# Patient Record
Sex: MALE | Race: WHITE | HISPANIC OR LATINO | URBAN - METROPOLITAN AREA
[De-identification: names, ages, dates, MRNs, and addresses within clinical notes are randomized per-mention and may not be internally consistent; named-entity substitution may affect disease eponyms.]

---

## 2019-04-01 ENCOUNTER — HOSPITAL ENCOUNTER (OUTPATIENT)
Facility: MEDICAL CENTER | Age: 34
End: 2019-04-01
Attending: PHYSICIAN ASSISTANT
Payer: COMMERCIAL

## 2019-04-01 ENCOUNTER — OFFICE VISIT (OUTPATIENT)
Dept: URGENT CARE | Facility: CLINIC | Age: 34
End: 2019-04-01
Payer: COMMERCIAL

## 2019-04-01 ENCOUNTER — HOSPITAL ENCOUNTER (OUTPATIENT)
Dept: LAB | Facility: MEDICAL CENTER | Age: 34
End: 2019-04-01
Attending: PHYSICIAN ASSISTANT
Payer: COMMERCIAL

## 2019-04-01 VITALS
OXYGEN SATURATION: 95 % | HEIGHT: 67 IN | DIASTOLIC BLOOD PRESSURE: 78 MMHG | BODY MASS INDEX: 29.03 KG/M2 | TEMPERATURE: 98.5 F | HEART RATE: 113 BPM | WEIGHT: 185 LBS | RESPIRATION RATE: 14 BRPM | SYSTOLIC BLOOD PRESSURE: 124 MMHG

## 2019-04-01 DIAGNOSIS — Z20.2 EXPOSURE TO SYPHILIS: ICD-10-CM

## 2019-04-01 DIAGNOSIS — Z21 HIV ANTIBODY POSITIVE (HCC): ICD-10-CM

## 2019-04-01 PROCEDURE — 86780 TREPONEMA PALLIDUM: CPT

## 2019-04-01 PROCEDURE — 87591 N.GONORRHOEAE DNA AMP PROB: CPT

## 2019-04-01 PROCEDURE — 86702 HIV-2 ANTIBODY: CPT

## 2019-04-01 PROCEDURE — 80074 ACUTE HEPATITIS PANEL: CPT

## 2019-04-01 PROCEDURE — 86592 SYPHILIS TEST NON-TREP QUAL: CPT

## 2019-04-01 PROCEDURE — 87491 CHLMYD TRACH DNA AMP PROBE: CPT

## 2019-04-01 PROCEDURE — 87389 HIV-1 AG W/HIV-1&-2 AB AG IA: CPT

## 2019-04-01 PROCEDURE — 36415 COLL VENOUS BLD VENIPUNCTURE: CPT

## 2019-04-01 PROCEDURE — 86701 HIV-1ANTIBODY: CPT

## 2019-04-01 PROCEDURE — 86593 SYPHILIS TEST NON-TREP QUANT: CPT

## 2019-04-01 PROCEDURE — 99203 OFFICE O/P NEW LOW 30 MIN: CPT | Performed by: PHYSICIAN ASSISTANT

## 2019-04-01 ASSESSMENT — ENCOUNTER SYMPTOMS
ABDOMINAL PAIN: 0
VOMITING: 0
FLANK PAIN: 0
NAUSEA: 0
FEVER: 0
DIARRHEA: 0
DIZZINESS: 0
HEADACHES: 0

## 2019-04-01 NOTE — PROGRESS NOTES
"Subjective:   Augusto Salamanca is a 33 y.o. male who presents for Sexually Transmitted Diseases    This is a new problem.  Patient presents to urgent care with concern for exposure to syphilis.  The patient is sexually active with multiple male partners without protection.  He reports that he was just notified that a partner he was with 2 months ago tested positive for syphilis.  The patient has no symptoms.    Past medical history, family history and social history are reviewed and updated in the record today.           Sexually Transmitted Diseases   Pertinent negatives include no abdominal pain, fever, headaches, nausea or vomiting.     Review of Systems   Constitutional: Negative for fever.   Gastrointestinal: Negative for abdominal pain, diarrhea, nausea and vomiting.   Genitourinary: Negative for dysuria, flank pain, frequency, hematuria and urgency.   Neurological: Negative for dizziness and headaches.   All other systems reviewed and are negative.    No Known Allergies     Objective:   /78   Pulse (!) 113   Temp 36.9 °C (98.5 °F) (Temporal)   Resp 14   Ht 1.702 m (5' 7\")   Wt 83.9 kg (185 lb)   SpO2 95%   BMI 28.98 kg/m²   Physical Exam   Constitutional: He is oriented to person, place, and time. He appears well-developed and well-nourished.   HENT:   Head: Normocephalic and atraumatic.   Right Ear: Tympanic membrane, external ear and ear canal normal.   Left Ear: Tympanic membrane, external ear and ear canal normal.   Nose: Nose normal.   Mouth/Throat: Uvula is midline, oropharynx is clear and moist and mucous membranes are normal. No oropharyngeal exudate.   Eyes: Pupils are equal, round, and reactive to light. Conjunctivae and EOM are normal.   Neck: Normal range of motion. Neck supple.   Cardiovascular: Normal rate, regular rhythm and normal heart sounds.  Exam reveals no friction rub.    No murmur heard.  Pulmonary/Chest: Effort normal and breath sounds normal. No respiratory distress. "   Abdominal: Soft. Bowel sounds are normal. There is no hepatosplenomegaly. There is no tenderness.   Genitourinary: Testes normal and penis normal. Cremasteric reflex is present. Circumcised. No discharge found.   Musculoskeletal: Normal range of motion.   Lymphadenopathy:        Head (right side): No submental, no submandibular and no tonsillar adenopathy present.        Head (left side): No submental, no submandibular and no tonsillar adenopathy present.     He has no cervical adenopathy.        Right: No supraclavicular adenopathy present.        Left: No supraclavicular adenopathy present.   Neurological: He is alert and oriented to person, place, and time. He has normal strength. No cranial nerve deficit or sensory deficit. Coordination normal.   Skin: Skin is warm and dry. No rash noted.   Psychiatric: He has a normal mood and affect. Judgment normal.   Vitals reviewed.         Assessment/Plan:   Assessment    1. Exposure to syphilis  - RPR  - HIV AG/AB COMBO ASSAY SCREENING; Future  - HEPATITIS PANEL ACUTE(4 COMPONENTS); Future  - CHLAMYDIA/GC PCR URINE OR SWAB; Future  - penicillin G benzathine (BICILLIN-LA) injection 2.4 Million Units; 4 mL by Intramuscular route Once.      Attempted to reach out to [facility] for confirmation regarding plan however they are at lunch and unavailable.  Per up-to-date guidelines patient will be treated with LA Bicillin 2,400,000 units here in the clinic.  We will go ahead and obtain testing as above which he must to do today due to the fact that he is being treated here in the clinic.  Encourage condom use.      Differential diagnosis, natural history, supportive care, and indications for immediate follow-up discussed.      Please note that this note was created using voice recognition speech to text software. Every effort has been made to correct obvious errors.  However, I expect there are errors of grammar and possibly context that were not discovered  prior to finalizing the note    4/4/19: Addendum: Confirm testing positive for HIV. Patient notified by telephone. I did attempt several times to contact [facility]'s clinic but was unsuccessful reaching a staff member. Urgent referral is placed to ID for further evaluation and management.  Patient is counseled on use of condoms at all times. Patient is also aware of positive testing for Syphillis. I did reach out to the referral department and reviewed with them the urgent nature of the referral and they are processing now.   SUZY Travis PA-C

## 2019-04-02 ENCOUNTER — TELEPHONE (OUTPATIENT)
Dept: URGENT CARE | Facility: CLINIC | Age: 34
End: 2019-04-02

## 2019-04-02 LAB
C TRACH DNA SPEC QL NAA+PROBE: NEGATIVE
HAV IGM SERPL QL IA: NEGATIVE
HBV CORE IGM SER QL: NEGATIVE
HBV SURFACE AG SER QL: NEGATIVE
HCV AB SER QL: NEGATIVE
HIV 1+2 AB+HIV1 P24 AG SERPL QL IA: ABNORMAL
N GONORRHOEA DNA SPEC QL NAA+PROBE: NEGATIVE
RPR SER QL: REACTIVE
RPR SER-TITR: NORMAL {TITER}
SPECIMEN SOURCE: NORMAL
TREPONEMA PALLIDUM IGG+IGM AB [PRESENCE] IN SERUM OR PLASMA BY IMMUNOASSAY: REACTIVE

## 2019-04-03 ENCOUNTER — TELEPHONE (OUTPATIENT)
Dept: URGENT CARE | Facility: CLINIC | Age: 34
End: 2019-04-03

## 2019-04-03 LAB
HIV 1 & 2 AB SER-IMP: ABNORMAL
HIV 1 & 2 AB SERPL IA.RAPID: ABNORMAL
HIV 2 AB SERPL QL IA: NEGATIVE
HIV1 AB SERPL QL IA: POSITIVE

## 2019-04-03 NOTE — TELEPHONE ENCOUNTER
Contacted patient with test results positive for syphilis.  Patient has been treated.  However, I will reach out to [facility] to ensure no additional treatment or management is required.  I also reviewed with the patient that his preliminary HIV was positive however this can be a false positive and has been sent for confirmation.  Once results are available I will reach out to the patient and if positive will refer to [facility] clinic.  I did attempt to reach [facility] clinic to no avail today.

## 2019-04-03 NOTE — TELEPHONE ENCOUNTER
Attempted to contact patient with positive test results for syphilis.  No answer, voicemail is full and unable to leave message.  I also was going to discuss with the patient that his preliminary on HIV came out positive but has been sent for confirmation.  Once test results are available I will likely place a referral to [facility].  I will continue to attempt to reach the patient once test results are confirmed.

## 2020-05-29 ENCOUNTER — HOSPITAL ENCOUNTER (OUTPATIENT)
Facility: MEDICAL CENTER | Age: 35
End: 2020-05-29
Payer: COMMERCIAL

## 2020-06-02 LAB
SARS-COV-2 RNA SPEC QL NAA+PROBE: NOT DETECTED
SPECIMEN SOURCE: NORMAL

## 2024-02-23 ENCOUNTER — HOSPITAL ENCOUNTER (OUTPATIENT)
Dept: RADIOLOGY | Facility: MEDICAL CENTER | Age: 39
End: 2024-02-23
Attending: PHYSICIAN ASSISTANT
Payer: COMMERCIAL

## 2024-02-23 DIAGNOSIS — N50.811 RIGHT TESTICULAR PAIN: ICD-10-CM

## 2024-02-23 DIAGNOSIS — N50.812 LEFT TESTICULAR PAIN: ICD-10-CM

## 2025-04-13 ENCOUNTER — OFFICE VISIT (OUTPATIENT)
Dept: URGENT CARE | Facility: PHYSICIAN GROUP | Age: 40
End: 2025-04-13
Payer: COMMERCIAL

## 2025-04-13 VITALS
DIASTOLIC BLOOD PRESSURE: 84 MMHG | WEIGHT: 199 LBS | HEIGHT: 71 IN | HEART RATE: 110 BPM | TEMPERATURE: 98.3 F | OXYGEN SATURATION: 97 % | BODY MASS INDEX: 27.86 KG/M2 | SYSTOLIC BLOOD PRESSURE: 116 MMHG | RESPIRATION RATE: 16 BRPM

## 2025-04-13 DIAGNOSIS — L02.32 BOIL OF BUTTOCK: ICD-10-CM

## 2025-04-13 DIAGNOSIS — J06.9 VIRAL URI: ICD-10-CM

## 2025-04-13 PROCEDURE — 99214 OFFICE O/P EST MOD 30 MIN: CPT

## 2025-04-13 PROCEDURE — 3079F DIAST BP 80-89 MM HG: CPT

## 2025-04-13 PROCEDURE — 3074F SYST BP LT 130 MM HG: CPT

## 2025-04-13 RX ORDER — SULFAMETHOXAZOLE AND TRIMETHOPRIM 800; 160 MG/1; MG/1
1 TABLET ORAL 2 TIMES DAILY
Qty: 14 TABLET | Refills: 0 | Status: SHIPPED | OUTPATIENT
Start: 2025-04-13 | End: 2025-04-19 | Stop reason: CLARIF

## 2025-04-13 RX ORDER — BICTEGRAVIR SODIUM, EMTRICITABINE, AND TENOFOVIR ALAFENAMIDE FUMARATE 50; 200; 25 MG/1; MG/1; MG/1
TABLET ORAL
COMMUNITY

## 2025-04-13 ASSESSMENT — ENCOUNTER SYMPTOMS
VOMITING: 1
ABDOMINAL PAIN: 0
DIARRHEA: 1
COUGH: 1
NAUSEA: 1
CHILLS: 1

## 2025-04-13 NOTE — LETTER
April 13, 2025    To Whom It May Concern:         This is confirmation that Augusto Salamanca attended his scheduled appointment with NORAH De Oliveira on 4/13/25. Please excuse for missed work time today and tomorrow on 4/14/25. Thank you for making accommodations for rest and recovery.          If you have any questions please do not hesitate to call me at the phone number listed below.    Sincerely,          EVY De OliveiraRAURORA.  [phone]

## 2025-04-13 NOTE — PROGRESS NOTES
"  CHIEF COMPLAINT  Chief Complaint   Patient presents with    Other     Chills, fever, ling pain, diarrhea, boil on R buttocks     Subjective:   Augusto Salamanca is a 39 y.o. male who presents to urgent care with concerns for symptoms of cough, nasal congestion, vomiting and diarrhea x 4 days. He also reports the development of a hard boil to his right butt cheek x 2 days. He reports attempting to squeeze boil, and was able to drain fluid from the site. Patient states that symptoms of pain and swelling have seemed to worsen. He reports subjective fever and chills, and decreased appetite. Patient states he has been attempting to alleviate symptoms with Nyquil and motrin.        Review of Systems   Constitutional:  Positive for chills and malaise/fatigue.   HENT:  Positive for congestion.    Respiratory:  Positive for cough.    Gastrointestinal:  Positive for diarrhea, nausea and vomiting. Negative for abdominal pain.       PAST MEDICAL HISTORY  There are no active problems to display for this patient.      SURGICAL HISTORY  patient denies any surgical history    ALLERGIES  No Known Allergies    CURRENT MEDICATIONS  Home Medications       Reviewed by Virgilio Meredith'giovanny (Medical Assistant) on 04/13/25 at 1449  Med List Status: <None>     Medication Last Dose Status   BIKTARVY -25 MG Tab tablet Taking Active                    SOCIAL HISTORY  Social History     Tobacco Use    Smoking status: Never    Smokeless tobacco: Never   Substance and Sexual Activity    Alcohol use: Not on file    Drug use: Yes     Types: Marijuana    Sexual activity: Not on file       FAMILY HISTORY  History reviewed. No pertinent family history.      Medications, Allergies, and current problem list reviewed today in Epic.     Objective:     /84 (BP Location: Left arm, Patient Position: Sitting, BP Cuff Size: Adult)   Pulse (!) 110   Temp 36.8 °C (98.3 °F) (Temporal)   Resp 16   Ht 1.803 m (5' 11\")   Wt 90.3 " kg (199 lb)   SpO2 97%     Physical Exam  Vitals reviewed.   Constitutional:       General: He is not in acute distress.     Appearance: Normal appearance.   HENT:      Nose: Nose normal.      Mouth/Throat:      Mouth: Mucous membranes are moist.      Pharynx: Oropharynx is clear.   Cardiovascular:      Rate and Rhythm: Normal rate.   Pulmonary:      Effort: Pulmonary effort is normal.   Skin:     General: Skin is warm.      Findings: Erythema present.             Comments: 3 mm open boil. No drainage. Surrounding skin is erythematous and indurated. No fluctuance appreciated.    Neurological:      General: No focal deficit present.      Mental Status: He is alert.   Psychiatric:         Mood and Affect: Mood normal.         Assessment/Plan:     Diagnosis and associated orders:     1. Boil of buttock  sulfamethoxazole-trimethoprim (BACTRIM DS) 800-160 MG tablet      2. Viral URI           Comments/MDM:     Patient presents with concerns for cough, congestion, nausea/vomiting, diarrhea x 4 days.  He also reports with concern for boil to right buttocks x 2 days.  He does report attempting to yuri and drain boil.  He has been taking OTC cold and flu medications as well as Advil for alleviation of discomfort.  Upon physical exam patient is alert no apparent signs of distress.  He is anxious appearing.  He is nontoxic.  Moderate congestion appreciated.  Mucous membranes are moist and clear.  He is clear to auscultation bilaterally.  No crackles, rhonchi or wheezes appreciated.  3 mm open boil wound to right buttocks.  No drainage.  Surrounding skin is erythematous and indurated.  No area of fluctuance appreciated.  Discussed concern for secondary infection related to recent lancing and attempted drainage.  Skin is hard and indurated, no fluctuance appreciated.  Given presentation would like to start patient of course of bactrim for treatment. Discussed follow up for potential drainage if symptoms persist.   Advised on  likely viral etiology of upper respiratory symptoms.  Patient declined viral testing today in clinic.  Continue with OTC and supportive measures.  Strict ER return and precautions discussed.  Patient comfortable with plan.         Differential diagnosis, natural history, supportive care, and indications for immediate follow-up discussed.    Advised the patient to follow-up with the primary care physician for recheck, reevaluation, and consideration of further management.    Please note that this dictation was created using voice recognition software. I have made a reasonable attempt to correct obvious errors, but I expect that there are errors of grammar and possibly content that I did not discover before finalizing the note.    This note was electronically signed by NORAH De Oliveira

## 2025-04-16 ENCOUNTER — OFFICE VISIT (OUTPATIENT)
Dept: URGENT CARE | Facility: PHYSICIAN GROUP | Age: 40
End: 2025-04-16
Payer: COMMERCIAL

## 2025-04-16 ENCOUNTER — HOSPITAL ENCOUNTER (OUTPATIENT)
Facility: MEDICAL CENTER | Age: 40
End: 2025-04-16
Attending: STUDENT IN AN ORGANIZED HEALTH CARE EDUCATION/TRAINING PROGRAM
Payer: COMMERCIAL

## 2025-04-16 VITALS
TEMPERATURE: 98.8 F | SYSTOLIC BLOOD PRESSURE: 120 MMHG | OXYGEN SATURATION: 97 % | HEART RATE: 108 BPM | DIASTOLIC BLOOD PRESSURE: 84 MMHG | RESPIRATION RATE: 14 BRPM | HEIGHT: 71 IN | BODY MASS INDEX: 27.86 KG/M2 | WEIGHT: 199 LBS

## 2025-04-16 DIAGNOSIS — L02.31 ABSCESS, GLUTEAL: ICD-10-CM

## 2025-04-16 PROCEDURE — 10060 I&D ABSCESS SIMPLE/SINGLE: CPT | Performed by: STUDENT IN AN ORGANIZED HEALTH CARE EDUCATION/TRAINING PROGRAM

## 2025-04-16 PROCEDURE — 87205 SMEAR GRAM STAIN: CPT

## 2025-04-16 PROCEDURE — 87186 SC STD MICRODIL/AGAR DIL: CPT

## 2025-04-16 PROCEDURE — 87077 CULTURE AEROBIC IDENTIFY: CPT

## 2025-04-16 PROCEDURE — 99213 OFFICE O/P EST LOW 20 MIN: CPT | Mod: 25 | Performed by: STUDENT IN AN ORGANIZED HEALTH CARE EDUCATION/TRAINING PROGRAM

## 2025-04-16 PROCEDURE — 87070 CULTURE OTHR SPECIMN AEROBIC: CPT

## 2025-04-16 RX ORDER — ACETAMINOPHEN 500 MG
1000 TABLET ORAL ONCE
Status: DISCONTINUED | OUTPATIENT
Start: 2025-04-16 | End: 2025-04-16

## 2025-04-16 ASSESSMENT — ENCOUNTER SYMPTOMS
DIARRHEA: 0
ABDOMINAL PAIN: 0
VOMITING: 0
FEVER: 0
CHILLS: 0
CONSTIPATION: 0
NAUSEA: 0

## 2025-04-16 NOTE — PROGRESS NOTES
"Subjective     Augusto Salamanca is a 39 y.o. male who presents with Cyst (R buttocks, drainage, x3d)            Augusto is a 39 y.o. male who presents urgent care for reevaluation of a boil on his left gluteal/buttock.  Patient was seen a few days in urgent care and was started on Bactrim.  Patient states that lesion started to drain.  He also reports increased tenderness, pain and pressure to the area.  No fever/chills.  Patient is taking antibiotics (Bactrim) as prescribed and has multiple days remaining.        Review of Systems   Constitutional:  Negative for chills and fever.   Gastrointestinal:  Negative for abdominal pain, constipation, diarrhea, nausea and vomiting.   All other systems reviewed and are negative.             Objective     /84 (BP Location: Right arm, Patient Position: Sitting, BP Cuff Size: Adult)   Pulse (!) 108   Temp 37.1 °C (98.8 °F) (Temporal)   Resp 14   Ht 1.803 m (5' 11\")   Wt 90.3 kg (199 lb)   SpO2 97%   BMI 27.75 kg/m²      Physical Exam  Vitals reviewed.   Constitutional:       Appearance: Normal appearance.   HENT:      Head: Normocephalic and atraumatic.   Eyes:      Conjunctiva/sclera: Conjunctivae normal.      Pupils: Pupils are equal, round, and reactive to light.   Musculoskeletal:         General: Normal range of motion.   Skin:     General: Skin is warm and dry.          Neurological:      General: No focal deficit present.      Mental Status: He is alert and oriented to person, place, and time.                                  Assessment & Plan  Abscess, gluteal    Orders:    CULTURE WOUND W/ GRAM STAIN; Future                 I personally reviewed prior external notes and test results pertinent to today's visit, including urgent care visit on 4/13/2025.  Patient was started on Bactrim at that visit to treat boil of buttock. Returns today for re-evaluation of boil.  Reports increased pain/pressure.  Also reports some drainage. Lesion is spontaneously " draining seropurulent drainage. Advised on risks, benefits, and alternatives of I&D. Risks include infection, bleeding, nerve damage, and poor cosmetic outcome. Patient has opted for I&D. Verbal consent obtained. See procedure note. Patient tolerated well. Continue Bactrim as prescribed.  Return in 2 days for wound re-check.    Differential diagnoses, supportive care measures, wound care instructions and indications for immediate follow-up discussed with patient. Pathogenesis of diagnosis discussed including typical length and natural progression.      Instructed to return to urgent care or nearest emergency department if symptoms fail to improve, for any change in condition, further concerns, or new concerning symptoms.    Patient states understanding and agrees with the plan of care and discharge instructions.

## 2025-04-16 NOTE — PROCEDURES
I and D    Date/Time: 4/16/2025 10:29 AM    Performed by: Vidhya Puente P.A.-C.  Authorized by: Vidhya Puente P.A.-C.  Type: abscess  Location: left glute/buttock.    Anesthesia:  Local Anesthetic: lidocaine 1% with epinephrine  Scalpel size: 11  Complexity: simple  Drainage: purulent  Patient tolerance: patient tolerated the procedure well with no immediate complications              
<-- Click to add NO pertinent Past Medical History

## 2025-04-17 LAB
GRAM STN SPEC: NORMAL
SIGNIFICANT IND 70042: NORMAL
SITE SITE: NORMAL
SOURCE SOURCE: NORMAL

## 2025-04-18 ENCOUNTER — OFFICE VISIT (OUTPATIENT)
Dept: URGENT CARE | Facility: PHYSICIAN GROUP | Age: 40
End: 2025-04-18
Payer: COMMERCIAL

## 2025-04-18 VITALS
HEART RATE: 88 BPM | SYSTOLIC BLOOD PRESSURE: 120 MMHG | TEMPERATURE: 96.6 F | RESPIRATION RATE: 16 BRPM | BODY MASS INDEX: 27.1 KG/M2 | HEIGHT: 71 IN | WEIGHT: 193.6 LBS | DIASTOLIC BLOOD PRESSURE: 90 MMHG

## 2025-04-18 DIAGNOSIS — L03.317 CELLULITIS OF RIGHT BUTTOCK: ICD-10-CM

## 2025-04-18 PROCEDURE — 99213 OFFICE O/P EST LOW 20 MIN: CPT | Performed by: STUDENT IN AN ORGANIZED HEALTH CARE EDUCATION/TRAINING PROGRAM

## 2025-04-18 RX ORDER — CEPHALEXIN 500 MG/1
500 CAPSULE ORAL 4 TIMES DAILY
Qty: 20 CAPSULE | Refills: 0 | Status: SHIPPED | OUTPATIENT
Start: 2025-04-18 | End: 2025-04-18

## 2025-04-18 RX ORDER — SULFAMETHOXAZOLE AND TRIMETHOPRIM 800; 160 MG/1; MG/1
2 TABLET ORAL 2 TIMES DAILY
Qty: 20 TABLET | Refills: 0 | Status: SHIPPED | OUTPATIENT
Start: 2025-04-18 | End: 2025-04-18

## 2025-04-18 RX ORDER — SULFAMETHOXAZOLE AND TRIMETHOPRIM 800; 160 MG/1; MG/1
2 TABLET ORAL 2 TIMES DAILY
Qty: 20 TABLET | Refills: 0 | Status: SHIPPED | OUTPATIENT
Start: 2025-04-18 | End: 2025-04-19 | Stop reason: CLARIF

## 2025-04-18 ASSESSMENT — PAIN SCALES - GENERAL: PAINLEVEL_OUTOF10: 4=SLIGHT-MODERATE PAIN

## 2025-04-18 NOTE — PROGRESS NOTES
"Urgent Care Visit Note  [facility]    04/18/25    Augusto Salamanca     [address], [address] [place] [address]     PCP: Pcp Pt States None     Subjective:     Chief Complaint   Patient presents with    Follow-Up     Boil upper right buttocks         HPI:  Augusto Salamanca is a 39 y.o. male who presents for right buttock abscess. Seen this week and had abscess drained. Doing better but was told to come in for reevaluation. Reports the redness has gone down a lot but it is still red. Not draining significant amounts anymore. Still painful but not nearly as much as before.     NO fever or chills. Otherwise feeling well and at his baseline.     ROS:  ROS     CURRENT MEDICATIONS:  Current Outpatient Medications   Medication Sig Refill Last Dispense    BIKTARVY -25 MG Tab tablet  (Patient not taking: Reported on 4/16/2025)  Unknown (patient-reported)    sulfamethoxazole-trimethoprim (BACTRIM DS) 800-160 MG tablet Take 1 Tablet by mouth 2 times a day for 7 days. 0 Unknown (outside pharmacy)    sulfamethoxazole-trimethoprim (BACTRIM DS) 800-160 MG tablet Take 2 Tablets by mouth 2 times a day for 5 days. 0 Unknown (outside pharmacy)       ALLERGIES:   No Known Allergies    PROBLEM LIST:    does not have a problem list on file.    Allergies, Medications, & Tobacco/Substance Use were reconciled by the Medical Assistant and reviewed by myself.     Objective:     BP (!) 120/90 (BP Location: Left arm, Patient Position: Sitting, BP Cuff Size: Adult)   Pulse 88   Temp 35.9 °C (96.6 °F) (Temporal)   Resp 16   Ht 1.803 m (5' 11\")   Wt 87.8 kg (193 lb 9.6 oz)   BMI 27.00 kg/m²     Physical Exam  Constitutional:       Appearance: Normal appearance.   HENT:      Head: Normocephalic and atraumatic.      Right Ear: External ear normal.      Left Ear: External ear normal.      Nose: Nose normal.   Eyes:      Extraocular Movements: Extraocular movements intact.      Pupils: Pupils are equal, round, and reactive to " light.   Cardiovascular:      Rate and Rhythm: Normal rate.      Pulses: Normal pulses.   Pulmonary:      Effort: Pulmonary effort is normal.   Abdominal:      General: Abdomen is flat.   Skin:     General: Skin is warm.      Capillary Refill: Capillary refill takes less than 2 seconds.             Comments: Small puncture wound of right upper buttock from previous abscess drainage. No significant active drainage. Indurated around wound with surrounding cellulitis about 2.5 cm in each direction but no current fluctuance.    Neurological:      Mental Status: He is alert and oriented to person, place, and time.      Gait: Gait normal.   Psychiatric:         Mood and Affect: Mood normal.         Behavior: Behavior normal.           Lab Results/POC Test Results   Results for orders placed or performed during the hospital encounter of 04/16/25   CULTURE WOUND W/ GRAM STAIN    Collection Time: 04/16/25 11:00 AM    Specimen: Wound   Result Value Ref Range    Significant Indicator POS (POS)     Source WND     Site Gluteal Abscess     Culture Result - (A)     Gram Stain Result Few WBCs.  Moderate Gram positive cocci.       Culture Result Staphylococcus aureus  Heavy growth   (A)    GRAM STAIN    Collection Time: 04/16/25 11:00 AM    Specimen: Wound   Result Value Ref Range    Significant Indicator .     Source WND     Site Gluteal Abscess     Gram Stain Result Few WBCs.  Moderate Gram positive cocci.                Assessment/Plan:     Patient's history and physical exam consistent with:    Assessment & Plan  Cellulitis of right buttock    Orders:    sulfamethoxazole-trimethoprim (BACTRIM DS) 800-160 MG tablet; Take 2 Tablets by mouth 2 times a day for 5 days.    Abscess drained fully based on gross exam but still has surrounding cellulitis that is improved but still present. Recommend extending course of antibiotics. Culture showing staph aureus infection so but continuing bactrim is appropriate. Recommend supportive care  otherwise. Return/ER precautions given.     Discussed differential diagnosis, management options, risks/benefits, and alternatives to planned treatment. Pt expressed understanding and the treatment plan was agreed upon. Questions were encouraged and answered. Pt encouraged to return to urgent care as needed if new or worsening symptoms or if there is no improvement in condition. Pt educated in red flags and indications to immediately call 911 or present to the Emergency Department. Advised the patient to follow-up with the primary care physician for recheck, reevaluation, and further management.    I personally reviewed prior external notes and test results pertinent to today's visit. I have independently reviewed and interpreted all diagnostics ordered during this visit.    Please note that this dictation was created using voice recognition software. I have made a reasonable attempt to correct obvious errors, but I expect that there are errors of grammar and possibly content that I did not discover before finalizing the note.    This note was electronically signed by Silas Jefferson MD

## 2025-04-19 ENCOUNTER — RESULTS FOLLOW-UP (OUTPATIENT)
Dept: URGENT CARE | Facility: PHYSICIAN GROUP | Age: 40
End: 2025-04-19
Payer: COMMERCIAL

## 2025-04-19 DIAGNOSIS — L02.31 ABSCESS, GLUTEAL: ICD-10-CM

## 2025-04-19 LAB
BACTERIA WND AEROBE CULT: ABNORMAL
BACTERIA WND AEROBE CULT: ABNORMAL
GRAM STN SPEC: ABNORMAL
SIGNIFICANT IND 70042: ABNORMAL
SITE SITE: ABNORMAL
SOURCE SOURCE: ABNORMAL

## 2025-04-19 RX ORDER — CLINDAMYCIN HYDROCHLORIDE 300 MG/1
300 CAPSULE ORAL 4 TIMES DAILY
Qty: 20 CAPSULE | Refills: 0 | Status: SHIPPED | OUTPATIENT
Start: 2025-04-19 | End: 2025-04-24

## 2025-04-19 NOTE — TELEPHONE ENCOUNTER
Contacted patient over the phone to discuss wound culture results.  Wound culture: Methicillin Resistant Staphylococcus aureus (Heavy growth).        Patient to stop Bactrim and Keflex.    Will send clindamycin to pharmacy on file.    Advised to return to urgent care or follow up with PCP if symptoms fail to improve, for any change in condition, further concerns or new concerning symptoms.

## 2025-04-26 ENCOUNTER — TELEPHONE (OUTPATIENT)
Dept: URGENT CARE | Facility: PHYSICIAN GROUP | Age: 40
End: 2025-04-26
Payer: COMMERCIAL

## 2025-04-26 ENCOUNTER — PATIENT MESSAGE (OUTPATIENT)
Dept: URGENT CARE | Facility: PHYSICIAN GROUP | Age: 40
End: 2025-04-26
Payer: COMMERCIAL